# Patient Record
Sex: MALE | Race: WHITE | URBAN - NONMETROPOLITAN AREA
[De-identification: names, ages, dates, MRNs, and addresses within clinical notes are randomized per-mention and may not be internally consistent; named-entity substitution may affect disease eponyms.]

---

## 2024-09-26 ENCOUNTER — OFFICE VISIT (OUTPATIENT)
Dept: URBAN - NONMETROPOLITAN AREA CLINIC 2 | Facility: CLINIC | Age: 29
End: 2024-09-26
Payer: COMMERCIAL

## 2024-09-26 ENCOUNTER — LAB OUTSIDE AN ENCOUNTER (OUTPATIENT)
Dept: URBAN - NONMETROPOLITAN AREA CLINIC 2 | Facility: CLINIC | Age: 29
End: 2024-09-26

## 2024-09-26 ENCOUNTER — DASHBOARD ENCOUNTERS (OUTPATIENT)
Age: 29
End: 2024-09-26

## 2024-09-26 VITALS
HEIGHT: 69 IN | WEIGHT: 147 LBS | BODY MASS INDEX: 21.77 KG/M2 | DIASTOLIC BLOOD PRESSURE: 80 MMHG | SYSTOLIC BLOOD PRESSURE: 120 MMHG | HEART RATE: 66 BPM

## 2024-09-26 DIAGNOSIS — R13.19 ESOPHAGEAL DYSPHAGIA: ICD-10-CM

## 2024-09-26 PROCEDURE — 99243 OFF/OP CNSLTJ NEW/EST LOW 30: CPT

## 2024-09-26 RX ORDER — BLOOD-GLUCOSE SENSOR
APPLY TO SKIN EVERY 10 DAYS EACH MISCELLANEOUS
Qty: 9 EACH | Refills: 1 | Status: ACTIVE | COMMUNITY

## 2024-09-26 RX ORDER — INSULIN PMP CART,AUT,G6/7,CNTR
INJECT 1 UNDER THE SKIN EVERY 3RD DAY EACH SUBCUTANEOUS
Qty: 30 EACH | Refills: 0 | Status: ACTIVE | COMMUNITY

## 2024-09-26 RX ORDER — RIZATRIPTAN BENZOATE 10 MG/1
TABLET ORAL
Qty: 8 TABLET | Status: ACTIVE | COMMUNITY

## 2024-09-26 RX ORDER — PROPYLTHIOURACIL 50 MG/1
TAKE 3 TABLETS BY MOUTH TWICE DAILY TABLET ORAL
Qty: 510 EACH | Refills: 1 | Status: ACTIVE | COMMUNITY

## 2024-09-26 RX ORDER — PANTOPRAZOLE SODIUM 20 MG/1
1 TABLET TABLET, DELAYED RELEASE ORAL ONCE A DAY
Qty: 90 TABLET | Refills: 3 | OUTPATIENT
Start: 2024-09-26

## 2024-09-26 RX ORDER — INSULIN LISPRO 100 [IU]/ML
INJECTION, SOLUTION INTRAVENOUS; SUBCUTANEOUS
Qty: 90 MILLILITER | Status: ACTIVE | COMMUNITY

## 2024-09-26 NOTE — HPI-TODAY'S VISIT:
9/26/24 Harpreet 28 yo male was referred to our office for evaluation of dysphagia by Dr. Virgilio Mcbride . A copy of this note and recommendations will be sent to the referring provider's office.  Prev EGD in [facility] 2021 no dilation at that time, noted inflammation Meat mainly cuts up small, takes time water eventually passes bolus no allergies denies odynophagia or weight loss, abdominal , nausea BM daily normal

## 2024-10-04 ENCOUNTER — CLAIMS CREATED FROM THE CLAIM WINDOW (OUTPATIENT)
Dept: URBAN - NONMETROPOLITAN AREA SURGERY CENTER 1 | Facility: SURGERY CENTER | Age: 29
End: 2024-10-04
Payer: COMMERCIAL

## 2024-10-04 ENCOUNTER — CLAIMS CREATED FROM THE CLAIM WINDOW (OUTPATIENT)
Dept: URBAN - METROPOLITAN AREA CLINIC 4 | Facility: CLINIC | Age: 29
End: 2024-10-04
Payer: COMMERCIAL

## 2024-10-04 DIAGNOSIS — K20.0 ALLERGIC EOSINOPHILIC ESOPHAGITIS: ICD-10-CM

## 2024-10-04 DIAGNOSIS — K22.89 OTHER SPECIFIED DISEASE OF ESOPHAGUS: ICD-10-CM

## 2024-10-04 DIAGNOSIS — K31.89 OTHER DISEASES OF STOMACH AND DUODENUM: ICD-10-CM

## 2024-10-04 DIAGNOSIS — R13.19 CERVICAL DYSPHAGIA: ICD-10-CM

## 2024-10-04 DIAGNOSIS — K20.0 EOSINOPHILIC ESOPHAGITIS: ICD-10-CM

## 2024-10-04 PROCEDURE — 88305 TISSUE EXAM BY PATHOLOGIST: CPT | Performed by: PATHOLOGY

## 2024-10-04 PROCEDURE — 00731 ANES UPR GI NDSC PX NOS: CPT | Performed by: NURSE ANESTHETIST, CERTIFIED REGISTERED

## 2024-10-04 PROCEDURE — 43248 EGD GUIDE WIRE INSERTION: CPT | Performed by: INTERNAL MEDICINE

## 2024-10-04 PROCEDURE — 88312 SPECIAL STAINS GROUP 1: CPT | Performed by: PATHOLOGY

## 2024-10-04 PROCEDURE — 43239 EGD BIOPSY SINGLE/MULTIPLE: CPT | Performed by: INTERNAL MEDICINE

## 2024-10-04 RX ORDER — INSULIN LISPRO 100 [IU]/ML
INJECTION, SOLUTION INTRAVENOUS; SUBCUTANEOUS
Qty: 90 MILLILITER | Status: ACTIVE | COMMUNITY

## 2024-10-04 RX ORDER — INSULIN PMP CART,AUT,G6/7,CNTR
INJECT 1 UNDER THE SKIN EVERY 3RD DAY EACH SUBCUTANEOUS
Qty: 30 EACH | Refills: 0 | Status: ACTIVE | COMMUNITY

## 2024-10-04 RX ORDER — PROPYLTHIOURACIL 50 MG/1
TAKE 3 TABLETS BY MOUTH TWICE DAILY TABLET ORAL
Qty: 510 EACH | Refills: 1 | Status: ACTIVE | COMMUNITY

## 2024-10-04 RX ORDER — PANTOPRAZOLE SODIUM 20 MG/1
1 TABLET TABLET, DELAYED RELEASE ORAL ONCE A DAY
Qty: 90 TABLET | Refills: 3 | Status: ACTIVE | COMMUNITY
Start: 2024-09-26

## 2024-10-04 RX ORDER — BLOOD-GLUCOSE SENSOR
APPLY TO SKIN EVERY 10 DAYS EACH MISCELLANEOUS
Qty: 9 EACH | Refills: 1 | Status: ACTIVE | COMMUNITY

## 2024-10-04 RX ORDER — RIZATRIPTAN BENZOATE 10 MG/1
TABLET ORAL
Qty: 8 TABLET | Status: ACTIVE | COMMUNITY

## 2024-11-14 ENCOUNTER — OFFICE VISIT (OUTPATIENT)
Dept: URBAN - NONMETROPOLITAN AREA CLINIC 2 | Facility: CLINIC | Age: 29
End: 2024-11-14
Payer: COMMERCIAL

## 2024-11-14 VITALS
WEIGHT: 150 LBS | HEIGHT: 69 IN | HEART RATE: 72 BPM | SYSTOLIC BLOOD PRESSURE: 123 MMHG | BODY MASS INDEX: 22.22 KG/M2 | DIASTOLIC BLOOD PRESSURE: 77 MMHG

## 2024-11-14 DIAGNOSIS — K20.0 EE (EOSINOPHILIC ESOPHAGITIS): ICD-10-CM

## 2024-11-14 DIAGNOSIS — R13.19 ESOPHAGEAL DYSPHAGIA: ICD-10-CM

## 2024-11-14 PROBLEM — 40890009: Status: ACTIVE | Noted: 2024-11-14

## 2024-11-14 PROBLEM — 235599003: Status: ACTIVE | Noted: 2024-11-14

## 2024-11-14 PROCEDURE — 99213 OFFICE O/P EST LOW 20 MIN: CPT

## 2024-11-14 RX ORDER — RIZATRIPTAN BENZOATE 10 MG/1
TAKE 1 TABLET BY MOUTH ONCE A DAY TABLET ORAL
Qty: 8 EACH | Refills: 3 | Status: ACTIVE | COMMUNITY

## 2024-11-14 RX ORDER — BLOOD-GLUCOSE SENSOR
APPLY TO SKIN EVERY 10 DAYS EACH MISCELLANEOUS
Qty: 9 EACH | Refills: 1 | Status: ACTIVE | COMMUNITY

## 2024-11-14 RX ORDER — PANTOPRAZOLE SODIUM 20 MG/1
1 TABLET TABLET, DELAYED RELEASE ORAL ONCE A DAY
Qty: 90 TABLET | Refills: 3 | Status: ACTIVE | COMMUNITY
Start: 2024-09-26

## 2024-11-14 RX ORDER — RIZATRIPTAN BENZOATE 10 MG/1
TABLET ORAL
Qty: 8 TABLET | Status: ACTIVE | COMMUNITY

## 2024-11-14 RX ORDER — PROPYLTHIOURACIL 50 MG/1
TAKE 3 TABLETS BY MOUTH TWICE DAILY TABLET ORAL
Qty: 540 EACH | Refills: 2 | Status: ACTIVE | COMMUNITY

## 2024-11-14 RX ORDER — INSULIN LISPRO 100 [IU]/ML
INJECTION, SOLUTION INTRAVENOUS; SUBCUTANEOUS
Qty: 90 MILLILITER | Status: ACTIVE | COMMUNITY

## 2024-11-14 RX ORDER — PANTOPRAZOLE SODIUM 20 MG/1
1 TABLET TABLET, DELAYED RELEASE ORAL ONCE A DAY
Qty: 90 TABLET | Refills: 3 | OUTPATIENT

## 2024-11-14 RX ORDER — INSULIN PMP CART,AUT,G6/7,CNTR
INJECT 1 UNDER THE SKIN EVERY 3RD DAY EACH SUBCUTANEOUS
Qty: 30 EACH | Refills: 0 | Status: ACTIVE | COMMUNITY

## 2024-11-14 RX ORDER — ATOGEPANT 60 MG/1
TAKE 1 TABLET BY MOUTH DAILY TABLET ORAL
Qty: 30 EACH | Refills: 0 | Status: ACTIVE | COMMUNITY

## 2024-11-14 RX ORDER — PROPYLTHIOURACIL 50 MG/1
TAKE 3 TABLETS BY MOUTH TWICE DAILY TABLET ORAL
Qty: 510 EACH | Refills: 1 | Status: ACTIVE | COMMUNITY

## 2024-11-14 RX ORDER — OFLOXACIN 3 MG/ML
SOLUTION OPHTHALMIC
Qty: 5 MILLILITER | Status: ACTIVE | COMMUNITY

## 2024-11-14 NOTE — HPI-TODAY'S VISIT:
11/14/2024 Curtis returns to clinic for follow-up for dysphagia after completing EGD with Dr. Avendano at the beginning of October biopsies resulting with eosinophils 120 per hpf , and she completed dilation with a savory dilator with no resistance at 57 [place].  He was noted to have mucosal changes including ringed esophagus and proximal esophagus midesophagus and distal esophagus.  He had been started on pantoprazole 20 mg at initial consult.  Today he states his dysphagia is markedly improved. He feels well from a GI standpoint and denies any reflux, abdominal pain, heartburn.  His weight is stable.  He does enjoy dairy in his diet but is willing to discontinue if this is necessary.  For now he is doing well on PPI.  Will get baseline labs including B12 magnesium and iron.  We discussed this diagnosis at length including any long-term concerns for fibrosis.  Discussed medical management and treatment. He will return for follow-up with Dr. Avendano to further discuss. SP

## 2024-11-18 LAB
A/G RATIO: 1.8
ABSOLUTE BASOPHILS: 49
ABSOLUTE EOSINOPHILS: 553
ABSOLUTE LYMPHOCYTES: 2625
ABSOLUTE MONOCYTES: 462
ABSOLUTE NEUTROPHILS: 3311
ALBUMIN: 4.6
ALKALINE PHOSPHATASE: 55
ALT (SGPT): 12
AST (SGOT): 13
BASOPHILS: 0.7
BILIRUBIN, TOTAL: 0.4
BUN/CREATININE RATIO: (no result)
BUN: 15
CALCIUM: 9.6
CARBON DIOXIDE, TOTAL: 28
CHLORIDE: 103
CREATININE: 0.78
EGFR: 125
EOSINOPHILS: 7.9
GLOBULIN, TOTAL: 2.6
GLUCOSE: 133
HEMATOCRIT: 43.3
HEMOGLOBIN: 14.4
IRON BIND.CAP.(TIBC): 256
IRON SATURATION: 29
IRON: 74
LYMPHOCYTES: 37.5
MAGNESIUM: 2
MCH: 29.3
MCHC: 33.3
MCV: 88
MONOCYTES: 6.6
MPV: 10
NEUTROPHILS: 47.3
PLATELET COUNT: 256
POTASSIUM: 4.5
PROTEIN, TOTAL: 7.2
RDW: 12.4
RED BLOOD CELL COUNT: 4.92
SODIUM: 138
VITAMIN B12: 675
WHITE BLOOD CELL COUNT: 7

## 2025-02-10 ENCOUNTER — TELEPHONE ENCOUNTER (OUTPATIENT)
Dept: URBAN - NONMETROPOLITAN AREA CLINIC 2 | Facility: CLINIC | Age: 30
End: 2025-02-10

## 2025-08-27 ENCOUNTER — OFFICE VISIT (OUTPATIENT)
Dept: URBAN - NONMETROPOLITAN AREA CLINIC 2 | Facility: CLINIC | Age: 30
End: 2025-08-27

## 2025-08-29 ENCOUNTER — OFFICE VISIT (OUTPATIENT)
Dept: URBAN - NONMETROPOLITAN AREA CLINIC 2 | Facility: CLINIC | Age: 30
End: 2025-08-29